# Patient Record
Sex: FEMALE | Race: WHITE | NOT HISPANIC OR LATINO
[De-identification: names, ages, dates, MRNs, and addresses within clinical notes are randomized per-mention and may not be internally consistent; named-entity substitution may affect disease eponyms.]

---

## 2017-04-11 ENCOUNTER — TRANSCRIPTION ENCOUNTER (OUTPATIENT)
Age: 57
End: 2017-04-11

## 2018-10-19 ENCOUNTER — APPOINTMENT (OUTPATIENT)
Dept: ORTHOPEDIC SURGERY | Facility: CLINIC | Age: 58
End: 2018-10-19
Payer: COMMERCIAL

## 2018-10-19 VITALS
SYSTOLIC BLOOD PRESSURE: 129 MMHG | WEIGHT: 200 LBS | BODY MASS INDEX: 31.39 KG/M2 | HEIGHT: 67 IN | DIASTOLIC BLOOD PRESSURE: 84 MMHG | HEART RATE: 88 BPM

## 2018-10-19 PROCEDURE — 72100 X-RAY EXAM L-S SPINE 2/3 VWS: CPT

## 2018-10-19 PROCEDURE — 73502 X-RAY EXAM HIP UNI 2-3 VIEWS: CPT | Mod: LT

## 2018-10-19 PROCEDURE — 99204 OFFICE O/P NEW MOD 45 MIN: CPT

## 2018-10-22 RX ORDER — GENTAMICIN SULFATE 1 MG/G
0.1 OINTMENT TOPICAL
Qty: 30 | Refills: 0 | Status: ACTIVE | COMMUNITY
Start: 2018-05-29

## 2018-10-22 RX ORDER — MELOXICAM 15 MG/1
15 TABLET ORAL
Qty: 30 | Refills: 0 | Status: ACTIVE | COMMUNITY
Start: 2018-06-19

## 2018-10-22 RX ORDER — CEPHALEXIN 500 MG/1
500 CAPSULE ORAL
Qty: 40 | Refills: 0 | Status: ACTIVE | COMMUNITY
Start: 2018-05-29

## 2018-11-30 ENCOUNTER — APPOINTMENT (OUTPATIENT)
Dept: ORTHOPEDIC SURGERY | Facility: CLINIC | Age: 58
End: 2018-11-30
Payer: COMMERCIAL

## 2018-11-30 VITALS
DIASTOLIC BLOOD PRESSURE: 84 MMHG | SYSTOLIC BLOOD PRESSURE: 124 MMHG | BODY MASS INDEX: 31.39 KG/M2 | HEIGHT: 67 IN | HEART RATE: 70 BPM | WEIGHT: 200 LBS

## 2018-11-30 DIAGNOSIS — Z78.9 OTHER SPECIFIED HEALTH STATUS: ICD-10-CM

## 2018-11-30 DIAGNOSIS — Z87.39 PERSONAL HISTORY OF OTHER DISEASES OF THE MUSCULOSKELETAL SYSTEM AND CONNECTIVE TISSUE: ICD-10-CM

## 2018-11-30 PROCEDURE — 99214 OFFICE O/P EST MOD 30 MIN: CPT

## 2018-11-30 PROCEDURE — 73502 X-RAY EXAM HIP UNI 2-3 VIEWS: CPT | Mod: LT

## 2018-12-15 ENCOUNTER — RX RENEWAL (OUTPATIENT)
Age: 58
End: 2018-12-15

## 2018-12-16 RX ORDER — DICLOFENAC SODIUM 75 MG/1
75 TABLET, DELAYED RELEASE ORAL
Qty: 60 | Refills: 1 | Status: ACTIVE | COMMUNITY
Start: 2018-10-19 | End: 1900-01-01

## 2018-12-17 ENCOUNTER — FORM ENCOUNTER (OUTPATIENT)
Age: 58
End: 2018-12-17

## 2018-12-18 ENCOUNTER — APPOINTMENT (OUTPATIENT)
Dept: MRI IMAGING | Facility: CLINIC | Age: 58
End: 2018-12-18

## 2018-12-18 ENCOUNTER — OUTPATIENT (OUTPATIENT)
Dept: OUTPATIENT SERVICES | Facility: HOSPITAL | Age: 58
LOS: 1 days | End: 2018-12-18
Payer: COMMERCIAL

## 2018-12-18 DIAGNOSIS — Z00.8 ENCOUNTER FOR OTHER GENERAL EXAMINATION: ICD-10-CM

## 2018-12-18 PROCEDURE — 73721 MRI JNT OF LWR EXTRE W/O DYE: CPT | Mod: 26,LT

## 2018-12-18 PROCEDURE — 73721 MRI JNT OF LWR EXTRE W/O DYE: CPT

## 2019-01-11 ENCOUNTER — APPOINTMENT (OUTPATIENT)
Dept: ORTHOPEDIC SURGERY | Facility: CLINIC | Age: 59
End: 2019-01-11
Payer: COMMERCIAL

## 2019-01-11 DIAGNOSIS — M23.92 UNSPECIFIED INTERNAL DERANGEMENT OF LEFT KNEE: ICD-10-CM

## 2019-01-11 DIAGNOSIS — M84.359A STRESS FRACTURE, HIP, UNSPECIFIED, INITIAL ENCOUNTER FOR FRACTURE: ICD-10-CM

## 2019-01-11 PROCEDURE — 99214 OFFICE O/P EST MOD 30 MIN: CPT

## 2019-01-11 RX ORDER — PSYLLIUM HUSK 0.4 G
1000-800 CAPSULE ORAL
Qty: 30 | Refills: 0 | Status: ACTIVE | COMMUNITY
Start: 2019-01-11 | End: 1900-01-01

## 2019-01-11 RX ORDER — DICLOFENAC SODIUM 75 MG/1
75 TABLET, DELAYED RELEASE ORAL
Qty: 1 | Refills: 1 | Status: ACTIVE | COMMUNITY
Start: 2019-01-11 | End: 1900-01-01

## 2019-01-11 NOTE — REVIEW OF SYSTEMS
[Arthralgia] : arthralgia [Joint Pain] : joint pain [Joint Stiffness] : joint stiffness [Joint Swelling] : joint swelling [Negative] : Heme/Lymph

## 2019-01-11 NOTE — PHYSICAL EXAM
[Coxalgic] : coxalgic [LE] : Sensory: Intact in bilateral lower extremities [Knee] : patellar 2+ and symmetric bilaterally [Ankle] : ankle 2+ and symmetric bilaterally [DP] : dorsalis pedis 2+ and symmetric bilaterally [PT] : posterior tibial 2+ and symmetric bilaterally [de-identified] : On general examination the patient is adequately groomed and nourished. The vital parameters are as recorded. \par There is no lymphedema or diffuse swelling, no varicose veins, no skin warmth/erythema/scars/swelling, no ulcers and no palpable lymph nodes or masses in both lower extremities. Bilateral pedal pulses are well palpable.\par Upper Extremity:\par Both right and left upper extremities are unremarkable in terms of skin rash, lesions, pigmentation, redness, tenderness, swelling, joint instability, abnormal deformity or crepitus. The overall range of motion, sensation, motor tone and strength testing are normal.\par \par Hip Exam:\par The gait is left stiff hip coxalgic.\par The patient has equal leg lengths \par Gabriel/Jhoan test is 6 inches on the right and 12 inches on the left. \par Active SLR is 60 degrees on the right and 40 degrees on the left. Both hips demonstrate no scars and the skin has no signs of inflammation or tenderness. \par Both Hips have a range of motion that is symmetrical in flexion and extension of:\par Hip flexion:            Right 100 degrees               Left 80 degrees\par Hip abduction:      Right 40 degrees                  Left 30 degrees\par Hip adduction:      Right 20 degrees                  Left  20 degrees\par Internal rotation:      Right 20 degrees                 Left 10 degrees\par External rotation:     Right 40 degrees                 Left 30 degrees\par ROM of the left hip is painful, but improved when compared to previous. \par There is no flexion contracture, deformity or instability. \par Labral impingement tests are negative.\par Right hip flexor, abductor and extensor power is grade 5.\par Left hip flexor, abductor and extensor power is grade 4+.\par \par Spine: There is no curvature of the spine or loss of normal lumbar lordosis. The skin is devoid of erythema, scars, pigmentation or rashes. There is no lumbar spasm and no tenderness especially at L4-L5 or L5-S1. \par The range of motion of the lumbar spine is normal and painless in flexion, extension, lateral flexion and rotation. There is no lumbar spine imbalance or instability detected.\par Bilateral passive SLR is 60 degrees in supine and sitting positions. Lasegue's Test is negative.\par Neurology:\par The patient is alert and oriented in person, place and time. The mood is calm and affect is normal.\par Testing for coordination including Rhomberg's Test and Finger-Nose Test, sensation, motor tone and power and deep tendon reflexes in both lower extremities is normal.\par \par Knee Exam\par The gait is left stiff knee antalgic.\par Knee alignment:   normal\par Both knees demonstrate no scars and the skin has no warmth, erythema, swelling or tenderness. \par Both knees have a range of motion of\par Extension:                    Right 0 degrees             Left -5 degrees\par Flexion:                                   Right 125 degrees          Left 125 degrees\par Left Knee: There is medial joint line tenderness. There is mild effusion. \par Darlyn's test is positive. Rohith test is positive.\par Lachman's test, Anterior/Posterior Drawer test and Pivot Shift Tests are negative. \par There is no mediolateral laxity and no anteroposterior instability. \par Patella compression test is negative and patellofemoral tracking is normal with no lateral subluxation, apprehension or instability. \par Right knee quadriceps and hamstrings power is 5. Right Knee Exam is normal.\par Left knee quadriceps and hamstrings power is 4+.\par  [de-identified] : The following radiographs were taken previous visit and read by me during this patients visit. I reviewed each radiograph in detail with the patient and discussed the findings as highlighted below. \par AP and false profile views of the left hip and AP view of the pelvis confirm moderate degenerative joint disease with joint space narrowing inferiorly with osteophyte formation and cyst formation. There is no acute femoral neck fracture. \par AP and lateral views of the lumbar spine taken lastt visit reveals intervertebral osteophytes s/p thanh placement and fusion L4-5, DJD\par \par MRI of the left hip 8/16/2018: Acute femoral neck stress fracture with marrow and periosseous edema. No discrete cortical break or displaced fragment.\par \par Repeat MRI :\par EXAM: MR HIP LT \par \par PROCEDURE DATE: 12/18/2018 \par \par INTERPRETATION: \par LEFT HIP MRI \par \par CLINICAL INDICATION: Left hip pain with clinical concern for stress fracture. \par \par TECHNIQUE: Multiplanar, multisequence MRI was obtained of the left hip. \par \par FINDINGS: \par \par There is a nondisplaced fracture along the medial aspect (compressive side) \par of the femoral neck with 1.7 cm in length fracture line and moderate \par surrounding bone marrow edema pattern. This has the appearance of a stress \par fracture. \par \par There is a small chronic appearing degenerative split tear in the \par anterosuperior labrum chronic degenerative blunting and fraying of the \par superior labrum. There is mild diffuse partial-thickness chondral loss in \par the left hip joint. There are no advanced arthritic changes. There is no \par osteonecrosis. There is a small left hip effusion. \par \par The abductors, short external rotators, adductors, and iliopsoas muscles and \par tendons are normal. There is mild proximal hamstring tendinopathy, without \par tear. \par \par The visualized neurovascular structures and intrapelvic contents are intact. \par \par IMPRESSION: \par \par Nondisplaced fracture along the medial aspect of the femoral neck \par (compressive side). This is the appearance of a stress fracture. \par \par MRI of the left knee reviewed as well, revealing a medial meniscus tear, with partial tearing/fraying along the free edge margin of the medial meniscus posterior horn. There is no acute displaced tear. \par

## 2019-01-11 NOTE — HISTORY OF PRESENT ILLNESS
[Stable] : stable [7] : an average pain level of 7/10 [de-identified] : Ms. KARLI CHAMORRO is a 57 year old female presents for follow up of left hip pain. She has had this pain beginning in August, following a fall she had while on vacation this summer. She states she landed directly onto the left knee off of a swimming tube. She soon after began to have left groin pain and difficulty walking. She was diagnosed with a left femoral neck stress fracture and treated conservatively with Pt, glucosamine, diclofenac, and rest. She states since onset the pain has improved. She is walking with the cane, but notes she has limited pain at this time, but continues to have weakness to the leg. She states weight bearing is not as painful at this time. She has been taking diclofenac, as well as resting as needed. She has had a repeat MRI on Dec 18, and is here for review. \par She also notes she continues to have more left knee pain than hip pain at this time, localized to the medial aspect of the left knee. She states it is activity related. \par She admits to a history of lumbar spine surgery during childhood, and admits to an altered gait pattern following this which has persisted throughout her life.  [3] : a current pain level of 3/10 [Walking] : walking [Intermit.] : ~He/She~ states the symptoms seem to be intermittent

## 2019-01-11 NOTE — DISCUSSION/SUMMARY
[de-identified] : Left hip DJD with femoral neck stress fracture, improving. \par The natural history and treatment of degenerative arthritis with stress reactions was discussed with the patient at length today. The spectrum of treatment including nonoperative modalities to surgical intervention was elucidated. Noninvasive and nonoperative treatment modalities include weight reduction, activity modification with low impact exercise,  as needed use of acetaminophen or anti-inflammatory medications if tolerated, glucosamine/chondroitin supplements, and physical therapy. Further treatments can include corticosteroid injection and the use of viscosupplementation with hyaluronic acid injections. Definitive surgical treatment can certainly include total joint arthroplasty also. The risks and benefits of each treatment options was discussed and all questions were answered.\par At this time her symptoms and exam findings are improving. The MRI is suggestive of healing. \par I have advised her to begin gentle exercise and physical therapy. I have recommended continued use of the cane. She has been recommended to avoid impact activities in therapy. A prescription for a course of physical therapy was provided. A refill prescription for non-steroidal anti-inflammatory medication - diclofenac was provided and the risks, benefits and side effects were discussed. \par She will follow up in 6-8 weeks.

## 2019-02-25 ENCOUNTER — RX RENEWAL (OUTPATIENT)
Age: 59
End: 2019-02-25

## 2019-02-25 RX ORDER — PANTOPRAZOLE 40 MG/1
40 TABLET, DELAYED RELEASE ORAL
Qty: 30 | Refills: 1 | Status: ACTIVE | COMMUNITY
Start: 2018-10-19 | End: 1900-01-01

## 2019-03-08 ENCOUNTER — APPOINTMENT (OUTPATIENT)
Dept: ORTHOPEDIC SURGERY | Facility: CLINIC | Age: 59
End: 2019-03-08
Payer: COMMERCIAL

## 2019-03-08 VITALS
HEIGHT: 67 IN | DIASTOLIC BLOOD PRESSURE: 86 MMHG | BODY MASS INDEX: 32.49 KG/M2 | SYSTOLIC BLOOD PRESSURE: 129 MMHG | WEIGHT: 207 LBS | HEART RATE: 69 BPM

## 2019-03-08 DIAGNOSIS — M16.12 UNILATERAL PRIMARY OSTEOARTHRITIS, LEFT HIP: ICD-10-CM

## 2019-03-08 PROCEDURE — 99213 OFFICE O/P EST LOW 20 MIN: CPT

## 2019-03-08 PROCEDURE — 73502 X-RAY EXAM HIP UNI 2-3 VIEWS: CPT | Mod: LT

## 2019-03-08 RX ORDER — PANTOPRAZOLE 40 MG/1
40 TABLET, DELAYED RELEASE ORAL
Qty: 30 | Refills: 0 | Status: ACTIVE | COMMUNITY
Start: 2019-03-08 | End: 1900-01-01

## 2019-03-08 RX ORDER — DICLOFENAC SODIUM 75 MG/1
75 TABLET, DELAYED RELEASE ORAL
Qty: 1 | Refills: 1 | Status: ACTIVE | COMMUNITY
Start: 2019-03-08 | End: 1900-01-01

## 2019-03-08 NOTE — DISCUSSION/SUMMARY
[de-identified] : Left hip DJD with femoral neck stress fracture, improving. \par The natural history and treatment of degenerative arthritis with stress reactions was discussed with the patient at length today. The spectrum of treatment including nonoperative modalities to surgical intervention was elucidated. Noninvasive and nonoperative treatment modalities include weight reduction, activity modification with low impact exercise,  as needed use of acetaminophen or anti-inflammatory medications if tolerated, glucosamine/chondroitin supplements, and physical therapy. Further treatments can include corticosteroid injection and the use of viscosupplementation with hyaluronic acid injections. Definitive surgical treatment can certainly include total joint arthroplasty also. The risks and benefits of each treatment options was discussed and all questions were answered.\par At this time her symptoms and exam findings are improved significantly.\par I have advised her to continue with physical therapy. She has been recommended to use the cane when needed in periods of increased pain.  She has been recommended to avoid excessive impact activities. She will continue physical therapy and home exercises. A refill prescription for non-steroidal anti-inflammatory medication - diclofenac was provided and the risks, benefits and side effects were discussed. \par She will follow up as needed.

## 2019-03-08 NOTE — PHYSICAL EXAM
[Coxalgic] : coxalgic [LE] : Sensory: Intact in bilateral lower extremities [Knee] : patellar 2+ and symmetric bilaterally [Ankle] : ankle 2+ and symmetric bilaterally [DP] : dorsalis pedis 2+ and symmetric bilaterally [PT] : posterior tibial 2+ and symmetric bilaterally [de-identified] : On general examination the patient is adequately groomed and nourished. The vital parameters are as recorded. \par There is no lymphedema or diffuse swelling, no varicose veins, no skin warmth/erythema/scars/swelling, no ulcers and no palpable lymph nodes or masses in both lower extremities. Bilateral pedal pulses are well palpable.\par Upper Extremity:\par Both right and left upper extremities are unremarkable in terms of skin rash, lesions, pigmentation, redness, tenderness, swelling, joint instability, abnormal deformity or crepitus. The overall range of motion, sensation, motor tone and strength testing are normal.\par \par Hip Exam:\par The gait is  coxalgic.\par The patient has equal leg lengths \par Gabriel/Jhoan test is 6 inches on the right and 8 inches on the left. \par Active SLR is 60 degrees on the right and 50 degrees on the left. Both hips demonstrate no scars and the skin has no signs of inflammation or tenderness. \par Both Hips have a range of motion that is symmetrical in flexion and extension of:\par Hip flexion:            Right 100 degrees               Left 90 degrees\par Hip abduction:      Right 40 degrees                  Left 40 degrees\par Hip adduction:      Right 20 degrees                  Left  20 degrees\par Internal rotation:      Right 20 degrees                 Left 10 degrees\par External rotation:     Right 40 degrees                 Left 30 degrees\par ROM of the left hip is mildly painful. \par There is no flexion contracture, deformity or instability. \par Labral impingement tests are negative.\par Right hip flexor, abductor and extensor power is grade 5.\par Left hip flexor, abductor and extensor power is grade 4+.\par \par Spine: There is no curvature of the spine or loss of normal lumbar lordosis. The skin is devoid of erythema, scars, pigmentation or rashes. There is no lumbar spasm and no tenderness especially at L4-L5 or L5-S1. \par The range of motion of the lumbar spine is normal and painless in flexion, extension, lateral flexion and rotation. There is no lumbar spine imbalance or instability detected.\par Bilateral passive SLR is 60 degrees in supine and sitting positions. Lasegue's Test is negative.\par Neurology:\par The patient is alert and oriented in person, place and time. The mood is calm and affect is normal.\par Testing for coordination including Rhomberg's Test and Finger-Nose Test, sensation, motor tone and power and deep tendon reflexes in both lower extremities is normal.\par \par Knee Exam\par The gait is left stiff knee antalgic.\par Knee alignment:   normal\par Both knees demonstrate no scars and the skin has no warmth, erythema, swelling or tenderness. \par Both knees have a range of motion of\par Extension:                    Right 0 degrees             Left -5 degrees\par Flexion:                                   Right 125 degrees          Left 125 degrees\par Left Knee: There is medial joint line tenderness. There is mild effusion. \par Darlyn's test is positive. Rohith test is positive.\par Lachman's test, Anterior/Posterior Drawer test and Pivot Shift Tests are negative. \par There is no mediolateral laxity and no anteroposterior instability. \par Patella compression test is negative and patellofemoral tracking is normal with no lateral subluxation, apprehension or instability. \par Right knee quadriceps and hamstrings power is 5. Right Knee Exam is normal.\par Left knee quadriceps and hamstrings power is 4+.\par  [de-identified] : The following radiographs were taken today and read by me during this patients visit. I reviewed each radiograph in detail with the patient and discussed the findings as highlighted below. \par AP and false profile views of the left hip and AP view of the pelvis confirm moderate degenerative joint disease with joint space narrowing inferiorly with osteophyte formation and cyst formation. There is no acute femoral neck fracture, no change. \par \par \par MRI of the left hip 8/16/2018: Acute femoral neck stress fracture with marrow and periosseous edema. No discrete cortical break or displaced fragment.\par \par Repeat MRI :\par EXAM: MR HIP LT \par \par PROCEDURE DATE: 12/18/2018 \par \par INTERPRETATION: \par LEFT HIP MRI \par \par CLINICAL INDICATION: Left hip pain with clinical concern for stress fracture. \par \par TECHNIQUE: Multiplanar, multisequence MRI was obtained of the left hip. \par \par FINDINGS: \par \par There is a nondisplaced fracture along the medial aspect (compressive side) \par of the femoral neck with 1.7 cm in length fracture line and moderate \par surrounding bone marrow edema pattern. This has the appearance of a stress \par fracture. \par \par There is a small chronic appearing degenerative split tear in the \par anterosuperior labrum chronic degenerative blunting and fraying of the \par superior labrum. There is mild diffuse partial-thickness chondral loss in \par the left hip joint. There are no advanced arthritic changes. There is no \par osteonecrosis. There is a small left hip effusion. \par \par The abductors, short external rotators, adductors, and iliopsoas muscles and \par tendons are normal. There is mild proximal hamstring tendinopathy, without \par tear. \par \par The visualized neurovascular structures and intrapelvic contents are intact. \par \par IMPRESSION: \par \par Nondisplaced fracture along the medial aspect of the femoral neck \par (compressive side). This is the appearance of a stress fracture. \par \par \par

## 2019-03-08 NOTE — HISTORY OF PRESENT ILLNESS
[Stable] : stable [3] : a current pain level of 3/10 [7] : an average pain level of 7/10 [Walking] : walking [Intermit.] : ~He/She~ states the symptoms seem to be intermittent [de-identified] : Ms. KARLI CHAMORRO is a 57 year old female presents for follow up of left hip pain. She has had this pain beginning in August, following a fall she had while on vacation this summer. She states she landed directly onto the left knee off of a swimming tube. She soon after began to have left groin pain and difficulty walking. She was diagnosed with a left femoral neck stress fracture and treated conservatively with Pt, glucosamine, diclofenac, and rest. She states since onset the pain has improved. She is now no longer requiring a cane for ambulation, and is able to walk faster and longer distances. She states weight bearing is not as painful at this time. She has been taking diclofenac, as well as resting as needed. She has been doing outpatient physical therapy, and is making significant progress with improving range of motion and strength. \par She admits to a history of lumbar spine surgery during childhood, and admits to an altered gait pattern following this which has persisted throughout her life.

## 2019-03-08 NOTE — CONSULT LETTER
[Dear  ___] : Dear  [unfilled], [Consult Letter:] : I had the pleasure of evaluating your patient, [unfilled]. [Please see my note below.] : Please see my note below. [Consult Closing:] : Thank you very much for allowing me to participate in the care of this patient.  If you have any questions, please do not hesitate to contact me. [Sincerely,] : Sincerely, [FreeTextEntry2] : TANESHA HENDERSON\par  [FreeTextEntry3] : Dimitri Serrano MD\par \par ______________________________________________\par [facility]: Hip/Knee Arthroplasty\par [address], [address], [place] [address]\par (t) [phone]\par (f) [phone]\par

## 2019-04-16 ENCOUNTER — APPOINTMENT (OUTPATIENT)
Dept: CARDIOLOGY | Facility: CLINIC | Age: 59
End: 2019-04-16
Payer: COMMERCIAL

## 2019-04-16 VITALS — DIASTOLIC BLOOD PRESSURE: 78 MMHG | SYSTOLIC BLOOD PRESSURE: 130 MMHG | RESPIRATION RATE: 16 BRPM | HEART RATE: 66 BPM

## 2019-04-16 DIAGNOSIS — R94.31 ABNORMAL ELECTROCARDIOGRAM [ECG] [EKG]: ICD-10-CM

## 2019-04-16 DIAGNOSIS — N32.81 OVERACTIVE BLADDER: ICD-10-CM

## 2019-04-16 DIAGNOSIS — E78.5 HYPERLIPIDEMIA, UNSPECIFIED: ICD-10-CM

## 2019-04-16 DIAGNOSIS — Z86.79 PERSONAL HISTORY OF OTHER DISEASES OF THE CIRCULATORY SYSTEM: ICD-10-CM

## 2019-04-16 DIAGNOSIS — R06.02 SHORTNESS OF BREATH: ICD-10-CM

## 2019-04-16 DIAGNOSIS — R06.00 DYSPNEA, UNSPECIFIED: ICD-10-CM

## 2019-04-16 DIAGNOSIS — Z87.19 PERSONAL HISTORY OF OTHER DISEASES OF THE DIGESTIVE SYSTEM: ICD-10-CM

## 2019-04-16 PROCEDURE — 93000 ELECTROCARDIOGRAM COMPLETE: CPT

## 2019-04-16 PROCEDURE — 99244 OFF/OP CNSLTJ NEW/EST MOD 40: CPT

## 2019-04-16 NOTE — PHYSICAL EXAM
[General Appearance - Well Developed] : well developed [Normal Appearance] : normal appearance [Well Groomed] : well groomed [General Appearance - Well Nourished] : well nourished [No Deformities] : no deformities [General Appearance - In No Acute Distress] : no acute distress [Normal Conjunctiva] : the conjunctiva exhibited no abnormalities [Eyelids - No Xanthelasma] : the eyelids demonstrated no xanthelasmas [Normal Oral Mucosa] : normal oral mucosa [No Oral Pallor] : no oral pallor [No Oral Cyanosis] : no oral cyanosis [Normal Jugular Venous A Waves Present] : normal jugular venous A waves present [Normal Jugular Venous V Waves Present] : normal jugular venous V waves present [No Jugular Venous Akers A Waves] : no jugular venous akers A waves [Normal Rate] : normal [Normal S1] : normal S1 [Normal S2] : normal S2 [No Murmur] : no murmurs heard [2+] : left 2+ [No Pitting Edema] : no pitting edema present [No Abnormalities] : the abdominal aorta was not enlarged and no bruit was heard [Exaggerated Use Of Accessory Muscles For Inspiration] : no accessory muscle use [Respiration, Rhythm And Depth] : normal respiratory rhythm and effort [Auscultation Breath Sounds / Voice Sounds] : lungs were clear to auscultation bilaterally [Abdomen Tenderness] : non-tender [Abdomen Soft] : soft [Abdomen Mass (___ Cm)] : no abdominal mass palpated [Abnormal Walk] : normal gait [Gait - Sufficient For Exercise Testing] : the gait was sufficient for exercise testing [Nail Clubbing] : no clubbing of the fingernails [Cyanosis, Localized] : no localized cyanosis [Petechial Hemorrhages (___cm)] : no petechial hemorrhages [Skin Color & Pigmentation] : normal skin color and pigmentation [] : no rash [No Venous Stasis] : no venous stasis [Skin Lesions] : no skin lesions [No Skin Ulcers] : no skin ulcer [No Xanthoma] : no  xanthoma was observed [Oriented To Time, Place, And Person] : oriented to person, place, and time [Mood] : the mood was normal [Affect] : the affect was normal [No Anxiety] : not feeling anxious [S3] : no S3 [S4] : no S4 [Right Carotid Bruit] : no bruit heard over the right carotid [Left Carotid Bruit] : no bruit heard over the left carotid [Right Femoral Bruit] : no bruit heard over the right femoral artery [Left Femoral Bruit] : no bruit heard over the left femoral artery

## 2019-04-16 NOTE — REASON FOR VISIT
[Initial Evaluation] : an initial evaluation of [Abnormal ECG] : an abnormal ECG [Hyperlipidemia] : hyperlipidemia [Dyspnea] : dyspnea

## 2019-04-16 NOTE — DISCUSSION/SUMMARY
[With Me] : with me [FreeTextEntry1] : Mrs. Urias is a 58 year old female here for initial evaluation. She reports some dyspnea on exertion over the last few months. Her EKG is a SR with LAD, though no evidence of ischemia. Her physical exam is unremarkable. Her BP is near goal\par Given her symptoms, she will have a 2d echo to evaluate for structural heart disease. I think she should also have a stress test though we will wait until her hip is feeling better and she can comfortably walk on a treadmill.\par Her discussed the fact that here ldl is elevated, and I have stressed the importance of weight loss, diet and exercise.\par I will call her with the results of the echocardiogram and arrange follow up.

## 2019-04-16 NOTE — HISTORY OF PRESENT ILLNESS
[FreeTextEntry1] : Mrs. Urias is a 58 year old female here for initial evaluation.\par She has a history of reflux on Protonix and an overactive bladder. She reports seeing a cardiologist in her 20's as part of a preoperative clearance. 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 She noted this to be worse while taking NSAIDs for a stress fracture in her hip. She is now feeling a little better.\par She denies chest pain, palpitations, lower extremity swelling, orthopnea, pnd, dizziness, lightheadedness and near syncope.\par Recent blood work was notable for an LDL of 145.\par \par

## 2019-05-06 ENCOUNTER — APPOINTMENT (OUTPATIENT)
Dept: CARDIOLOGY | Facility: CLINIC | Age: 59
End: 2019-05-06
Payer: COMMERCIAL

## 2019-05-06 PROCEDURE — 93306 TTE W/DOPPLER COMPLETE: CPT

## 2020-10-30 ENCOUNTER — APPOINTMENT (OUTPATIENT)
Dept: ORTHOPEDIC SURGERY | Facility: CLINIC | Age: 60
End: 2020-10-30
Payer: COMMERCIAL

## 2020-10-30 VITALS
WEIGHT: 205 LBS | DIASTOLIC BLOOD PRESSURE: 89 MMHG | HEIGHT: 65 IN | SYSTOLIC BLOOD PRESSURE: 131 MMHG | BODY MASS INDEX: 34.16 KG/M2 | HEART RATE: 73 BPM

## 2020-10-30 DIAGNOSIS — M54.5 LOW BACK PAIN: ICD-10-CM

## 2020-10-30 DIAGNOSIS — M70.60 TROCHANTERIC BURSITIS, UNSPECIFIED HIP: ICD-10-CM

## 2020-10-30 DIAGNOSIS — Z98.1 ARTHRODESIS STATUS: ICD-10-CM

## 2020-10-30 PROCEDURE — 72100 X-RAY EXAM L-S SPINE 2/3 VWS: CPT

## 2020-10-30 PROCEDURE — 99213 OFFICE O/P EST LOW 20 MIN: CPT

## 2020-10-30 PROCEDURE — 99072 ADDL SUPL MATRL&STAF TM PHE: CPT

## 2020-10-30 PROCEDURE — 73502 X-RAY EXAM HIP UNI 2-3 VIEWS: CPT | Mod: LT

## 2020-10-30 RX ORDER — DICLOFENAC SODIUM 75 MG/1
75 TABLET, DELAYED RELEASE ORAL
Qty: 1 | Refills: 0 | Status: ACTIVE | COMMUNITY
Start: 2020-10-30 | End: 1900-01-01

## 2020-10-30 NOTE — CONSULT LETTER
[Dear  ___] : Dear  [unfilled], [Consult Letter:] : I had the pleasure of evaluating your patient, [unfilled]. [Please see my note below.] : Please see my note below. [Consult Closing:] : Thank you very much for allowing me to participate in the care of this patient.  If you have any questions, please do not hesitate to contact me. [Sincerely,] : Sincerely, [FreeTextEntry2] : TANESHA HENDERSON\par  [FreeTextEntry3] : Dimitri Serrano MD\par \par ______________________________________________\par [facility]: Hip/Knee Arthroplasty\par [address], [address], [place] [address]\par (t) [phone]\par (f) [phone]\par

## 2020-10-30 NOTE — DISCUSSION/SUMMARY
[de-identified] : Left hip early DJD, with left hip GT bursitis, lumbar spine extensive surgical history. \par \par The natural history and treatment of degenerative arthritis with stress reactions was discussed with the patient at length today. The spectrum of treatment including nonoperative modalities to surgical intervention was elucidated. Noninvasive and nonoperative treatment modalities include weight reduction, activity modification with low impact exercise,  as needed use of acetaminophen or anti-inflammatory medications if tolerated, glucosamine/chondroitin supplements, and physical therapy. Further treatments can include corticosteroid injection and the use of viscosupplementation with hyaluronic acid injections. Definitive surgical treatment can certainly include total joint arthroplasty also. The risks and benefits of each treatment options was discussed and all questions were answered.\par At this time her symptoms are mainly stemming from the GT bursitis, and she has been recommended conservative treatment. She was offered a steroid injection to the bursal region, but would like to hold off. The patient was informed of the findings and recommended conservative management in the form of a home exercise program, activity modifications, stationary bicycling, swimming and weight loss program. A trial of Glucosamine and Chondroiten Sulphate was recommended. She has been recommended to avoid the inversion table as she reports this is what led to her increased pain\par A prescription for a course of physical therapy was provided for her left hip, including recommendation for ultrasound treatment for the bursitis. \par A prescription for non-steroidal anti-inflammatory medication - diclofenac was provided and the risks, benefits and side effects were discussed.\par Follow-up appointment was recommended in as needed in two months. \par

## 2020-10-30 NOTE — PHYSICAL EXAM
[Coxalgic] : coxalgic [LE] : Sensory: Intact in bilateral lower extremities [Knee] : patellar 2+ and symmetric bilaterally [Ankle] : ankle 2+ and symmetric bilaterally [DP] : dorsalis pedis 2+ and symmetric bilaterally [PT] : posterior tibial 2+ and symmetric bilaterally [de-identified] : On general examination the patient is adequately groomed and nourished. The vital parameters are as recorded. \par There is no lymphedema or diffuse swelling, no varicose veins, no skin warmth/erythema/scars/swelling, no ulcers and no palpable lymph nodes or masses in both lower extremities. Bilateral pedal pulses are well palpable.\par Upper Extremity:\par Both right and left upper extremities are unremarkable in terms of skin rash, lesions, pigmentation, redness, tenderness, swelling, joint instability, abnormal deformity or crepitus. The overall range of motion, sensation, motor tone and strength testing are normal.\par \par Hip Exam:\par The gait is  coxalgic.\par The patient has equal leg lengths. There is a mild left sided pelvic tilt due to the lumbar spine. \par Gabriel/Jhoan test is 6 inches on the right and 8 inches on the left. \par Active SLR is 60 degrees on the right and 50 degrees on the left. Both hips demonstrate no scars and the skin has no signs of inflammation or tenderness. There is tenderness to palpation of the left greater trochanteric bursal region. \par Both Hips have a range of motion that is symmetrical in flexion and extension of:\par Hip flexion:            Right 100 degrees               Left 90 degrees\par Hip abduction:      Right 40 degrees                  Left 40 degrees\par Hip adduction:      Right 20 degrees                  Left  20 degrees\par Internal rotation:      Right 20 degrees                 Left 10 degrees\par External rotation:     Right 40 degrees                 Left 30 degrees\par ROM of the left hip is mildly painful in the extremes of ER and abduction. \par There is no flexion contracture, deformity or instability. \par Labral impingement tests are negative.\par Right hip flexor, abductor and extensor power is grade 5.\par Left hip flexor, abductor and extensor power is grade 4+.\par \par Spine: There is flattening of the normal lordosis. There are well healed scars from previous surgeries. The skin is devoid of erythema, pigmentation or rashes. There is no lumbar spasm and no tenderness especially at L4-L5 or L5-S1. \par The range of motion of the lumbar spine is significantly restricted, with prior fusion and painless in flexion, extension, lateral flexion and rotation. There is no lumbar spine imbalance or instability detected.\par Bilateral passive SLR is 60 degrees in supine and sitting positions. Lasegue's Test is negative.\par Neurology:\par The patient is alert and oriented in person, place and time. The mood is calm and affect is normal.\par Testing for coordination including Rhomberg's Test and Finger-Nose Test, sensation, motor tone and power and deep tendon reflexes in both lower extremities is normal.\par \par Knee Exam: \par The gait and station is normal\par Both knees have a valgus alignment of 5 degrees with no flexion contracture or deformity. Both knees demonstrate no scars and the skin has no warmth, erythema, swelling or tenderness. \par Both knees have a normal range of motion of 0 degrees to 130 degrees flexion. There are no signs of joint line tenderness or effusion. \par Darlyn's test is negative. Rohith test is negative. \par Lachman's test, Anterior/Posterior Drawer test and Pivot Shift Tests are negative. There is no mediolateral laxity and no anteroposterior instability. \par Patella compression test is negative and patellofemoral tracking is normal with no lateral subluxation, apprehension or instability. \par Both knees quadriceps and hamstrings power is normal.\par \par  \par  [de-identified] : The following radiographs were taken today and read by me during this patients visit. I reviewed each radiograph in detail with the patient and discussed the findings as highlighted below. \par AP and false profile views of the left hip and AP view of the pelvis confirm mild to moderate degenerative joint disease with joint space narrowing inferiorly. There is no acute femoral neck fracture, no change. \par AP and lateral views of the lumbar spine reveal s/p multiple fusions, Pineda rods, with L4-5, with flat back,loss of lordosis of the spine\par \par \par

## 2020-10-30 NOTE — HISTORY OF PRESENT ILLNESS
[7] : an average pain level of 7/10 [Intermit.] : ~He/She~ states the symptoms seem to be intermittent [Worsening] : worsening [5] : a current pain level of 5/10 [Direct Pressure] : worsened by direct pressure [Sitting] : worsened by sitting [Walking] : worsened by walking [Rest] : relieved by rest [de-identified] : Ms. KARLI CHAMORRO is a 57 year old female presents for follow up of left hip pain. She was last seen in Mar 2019 for the same complaint. She was diagnosed with a left femoral neck stress fracture and treated conservatively with Pt, glucosamine, diclofenac, and rest. She was feeling great for some time, but developed left lateral sided hip pain over the last month. She attributes the pain to using an inversion table, and notes she developed pain following doing the activity over a couple days. She notes her pain is localized to the lateral aspect of the left hip, sore in nature, and present with direct pressure, walking long distances, with hip motion in the extremes. She notes occasionally the pain radiates, but rarely. She also notes she has had soreness along the anterior superior left knee. She notes it feels muscular in nature, present when walking. She has been taking over the counter medications as needed. She denies any recent treatment for her symptoms. \par She admits to a long history of lower back pain, with prior surgery as a child with Pnieda rods in 1977, and then a lumbar fusion more recently in 2013 at [facility].  She notes she has had an altered gait pattern since childhood due to the above, and notes she has had stiffness and soreness in the pelvis and hips longstanding. \par

## 2022-03-29 ENCOUNTER — OUTPATIENT (OUTPATIENT)
Dept: OUTPATIENT SERVICES | Facility: HOSPITAL | Age: 62
LOS: 1 days | End: 2022-03-29
Payer: COMMERCIAL

## 2022-03-29 DIAGNOSIS — Z20.828 CONTACT WITH AND (SUSPECTED) EXPOSURE TO OTHER VIRAL COMMUNICABLE DISEASES: ICD-10-CM

## 2022-03-29 LAB — SARS-COV-2 RNA SPEC QL NAA+PROBE: SIGNIFICANT CHANGE UP

## 2022-03-29 PROCEDURE — U0005: CPT

## 2022-03-29 PROCEDURE — U0003: CPT

## 2022-03-31 ENCOUNTER — OUTPATIENT (OUTPATIENT)
Dept: OUTPATIENT SERVICES | Facility: HOSPITAL | Age: 62
LOS: 1 days | End: 2022-03-31
Payer: COMMERCIAL

## 2022-03-31 DIAGNOSIS — M54.16 RADICULOPATHY, LUMBAR REGION: ICD-10-CM

## 2022-03-31 PROCEDURE — 62323 NJX INTERLAMINAR LMBR/SAC: CPT

## 2023-03-23 ENCOUNTER — TRANSCRIPTION ENCOUNTER (OUTPATIENT)
Age: 63
End: 2023-03-23

## 2023-03-31 ENCOUNTER — APPOINTMENT (OUTPATIENT)
Dept: PAIN MANAGEMENT | Facility: CLINIC | Age: 63
End: 2023-03-31
Payer: COMMERCIAL

## 2023-03-31 VITALS — WEIGHT: 205 LBS | HEIGHT: 65 IN | BODY MASS INDEX: 34.16 KG/M2

## 2023-03-31 PROCEDURE — 99213 OFFICE O/P EST LOW 20 MIN: CPT

## 2023-03-31 NOTE — HISTORY OF PRESENT ILLNESS
[Lower back] : lower back [7] : 7 [5] : 5 [Dull/Aching] : dull/aching [Constant] : constant [Household chores] : household chores [Sleep] : sleep [Sitting] : sitting [Lying in bed] : lying in bed [Full time] : Work status: full time [de-identified] : 03/31/2023- PATIENT STATES HAS DONE PHYSICAL THERAPY IN THE PAST UNKWN DATE AS PER PT 3X A WEEK  AND REPORT'S MILD IMPROVEMENT WITH PAIN.\par PT STATES CURRENTLT HOME STRETCHING PROGRAM AT HOME 3X A WEEK  AND REPORT'S MILD IMPROVEMENT WITH PAIN.  \par  [] : Post Surgical Visit: no [FreeTextEntry6] : NUBNESS [FreeTextEntry7] : RT IVANIA EONLY  [de-identified] : MRI LS 03/16/2023

## 2023-03-31 NOTE — REASON FOR VISIT
[FreeTextEntry2] : PT IS BEING SEEN FOR A FOLLOW-UP IN OFFICE  PAIN MANAGEMENT VISIT FOR EVAL FOR MUINRA

## 2023-03-31 NOTE — ASSESSMENT
[FreeTextEntry1] : Interim history\par he patient returns with pain that has been treated adequately in the past with epidural steroid injections.  The patient's complaints are consistent with radiculopathy. Patient's ADL's increase with prior MUNIRA.   The last injection gave greater than 60% reduction of the pain but now there is a return of symptoms. The patient is requesting a subsequent epidural steroid injection to alleviate pain and improve functional ability and quality of life.  Patient is a candidate.\par Objective findings\par Since the last visit, there have been no new imaging studies. THe patient has no new symptoms except the return of the their pain complaints. Motor and sensory function is unchanged.\par Plan\par Spoke to patient about epidural steroid injections. Explained risks, benefits and alternatives including but not limited to the risk of infection, bleeding, headache, syncopal episode, failure to resolve issues, allergic reaction, symptom recurrence, allergic reaction, nerve injury, and increased pain. The patient understands the risks. All questions were answered. The patient is willing to proceed.\par

## 2023-04-04 ENCOUNTER — TRANSCRIPTION ENCOUNTER (OUTPATIENT)
Age: 63
End: 2023-04-04

## 2023-04-04 RX ORDER — DICLOFENAC SODIUM 75 MG/1
1 TABLET, DELAYED RELEASE ORAL
Refills: 0 | DISCHARGE

## 2023-04-04 NOTE — ASU PATIENT PROFILE, ADULT - NSICDXPASTSURGICALHX_GEN_ALL_CORE_FT
PAST SURGICAL HISTORY:  H/O foot surgery B/L    History of lumbar fusion x2    History of tonsillectomy

## 2023-04-04 NOTE — ASU DISCHARGE PLAN (ADULT/PEDIATRIC) - NS MD DC FALL RISK RISK
For information on Fall & Injury Prevention, visit: [email or web address] OR  [email or web address] OR  https://www.cdc.gov/steadi/patient.html

## 2023-04-05 RX ORDER — PANTOPRAZOLE SODIUM 20 MG/1
1 TABLET, DELAYED RELEASE ORAL
Refills: 0 | DISCHARGE

## 2023-04-06 ENCOUNTER — APPOINTMENT (OUTPATIENT)
Dept: ORTHOPEDIC SURGERY | Facility: HOSPITAL | Age: 63
End: 2023-04-06
Payer: COMMERCIAL

## 2023-04-06 ENCOUNTER — OUTPATIENT (OUTPATIENT)
Dept: OUTPATIENT SERVICES | Facility: HOSPITAL | Age: 63
LOS: 1 days | End: 2023-04-06
Payer: COMMERCIAL

## 2023-04-06 VITALS
OXYGEN SATURATION: 99 % | TEMPERATURE: 98 F | HEIGHT: 67 IN | DIASTOLIC BLOOD PRESSURE: 90 MMHG | HEART RATE: 74 BPM | SYSTOLIC BLOOD PRESSURE: 118 MMHG | RESPIRATION RATE: 14 BRPM | WEIGHT: 212.08 LBS

## 2023-04-06 VITALS
RESPIRATION RATE: 17 BRPM | OXYGEN SATURATION: 97 % | DIASTOLIC BLOOD PRESSURE: 69 MMHG | TEMPERATURE: 98 F | SYSTOLIC BLOOD PRESSURE: 109 MMHG | HEART RATE: 71 BPM

## 2023-04-06 DIAGNOSIS — M54.16 RADICULOPATHY, LUMBAR REGION: ICD-10-CM

## 2023-04-06 DIAGNOSIS — Z98.1 ARTHRODESIS STATUS: Chronic | ICD-10-CM

## 2023-04-06 DIAGNOSIS — Z90.89 ACQUIRED ABSENCE OF OTHER ORGANS: Chronic | ICD-10-CM

## 2023-04-06 DIAGNOSIS — Z98.890 OTHER SPECIFIED POSTPROCEDURAL STATES: Chronic | ICD-10-CM

## 2023-04-06 PROCEDURE — 62323 NJX INTERLAMINAR LMBR/SAC: CPT

## 2023-04-06 RX ORDER — MELOXICAM 15 MG/1
1 TABLET ORAL
Refills: 0 | DISCHARGE

## 2023-04-06 RX ORDER — SEMAGLUTIDE 0.68 MG/ML
0.5 INJECTION, SOLUTION SUBCUTANEOUS
Refills: 0 | DISCHARGE

## 2023-04-06 RX ORDER — DICLOFENAC SODIUM 75 MG/1
1 TABLET, DELAYED RELEASE ORAL
Refills: 0 | DISCHARGE

## 2023-05-26 PROBLEM — M54.16 RADICULOPATHY, LUMBAR REGION: Chronic | Status: ACTIVE | Noted: 2023-04-04

## 2023-05-26 PROBLEM — K21.9 GASTRO-ESOPHAGEAL REFLUX DISEASE WITHOUT ESOPHAGITIS: Chronic | Status: ACTIVE | Noted: 2023-04-04

## 2023-06-16 ENCOUNTER — APPOINTMENT (OUTPATIENT)
Dept: PAIN MANAGEMENT | Facility: CLINIC | Age: 63
End: 2023-06-16
Payer: COMMERCIAL

## 2023-06-16 DIAGNOSIS — M54.16 RADICULOPATHY, LUMBAR REGION: ICD-10-CM

## 2023-06-16 PROCEDURE — 99213 OFFICE O/P EST LOW 20 MIN: CPT

## 2023-06-16 RX ORDER — AMITRIPTYLINE HYDROCHLORIDE 10 MG/1
10 TABLET, FILM COATED ORAL
Qty: 90 | Refills: 5 | Status: ACTIVE | COMMUNITY
Start: 2023-06-16 | End: 1900-01-01

## 2023-06-16 NOTE — HISTORY OF PRESENT ILLNESS
[Lower back] : lower back [7] : 7 [5] : 5 [Dull/Aching] : dull/aching [Constant] : constant [Household chores] : household chores [Sleep] : sleep [Sitting] : sitting [Lying in bed] : lying in bed [Full time] : Work status: full time [Steroid] : Steroid [] : Post Surgical Visit: no [FreeTextEntry6] : NUBNESS [FreeTextEntry7] : RT IVANIA EONLY  [de-identified] : MRI LS 03/16/2023 [de-identified] : 04/06/2023 [de-identified] : LS  [de-identified] : LEONARD  [TWNoteComboBox1] : 10% [de-identified] : 03/31/2023- PATIENT STATES HAS DONE PHYSICAL THERAPY IN THE PAST UNKWN DATE AS PER PT 3X A WEEK  AND REPORT'S MILD IMPROVEMENT WITH PAIN.\par PT STATES CURRENTLT HOME STRETCHING PROGRAM AT HOME 3X A WEEK  AND REPORT'S MILD IMPROVEMENT WITH PAIN.  \par

## 2023-06-28 ENCOUNTER — APPOINTMENT (OUTPATIENT)
Dept: PAIN MANAGEMENT | Facility: CLINIC | Age: 63
End: 2023-06-28

## 2024-03-15 ENCOUNTER — NON-APPOINTMENT (OUTPATIENT)
Age: 64
End: 2024-03-15

## 2025-05-13 ENCOUNTER — APPOINTMENT (OUTPATIENT)
Dept: ORTHOPEDIC SURGERY | Facility: CLINIC | Age: 65
End: 2025-05-13
Payer: COMMERCIAL

## 2025-05-13 DIAGNOSIS — M67.449 GANGLION, UNSPECIFIED HAND: ICD-10-CM

## 2025-05-13 DIAGNOSIS — E78.00 PURE HYPERCHOLESTEROLEMIA, UNSPECIFIED: ICD-10-CM

## 2025-05-13 PROCEDURE — 73140 X-RAY EXAM OF FINGER(S): CPT | Mod: LT

## 2025-05-13 PROCEDURE — 99214 OFFICE O/P EST MOD 30 MIN: CPT

## 2025-05-13 RX ORDER — SEMAGLUTIDE 2.4 MG/.75ML
2.4 INJECTION, SOLUTION SUBCUTANEOUS
Refills: 0 | Status: ACTIVE | COMMUNITY

## 2025-05-29 RX ORDER — HYDROCODONE BITARTRATE AND ACETAMINOPHEN 5; 325 MG/1; MG/1
5-325 TABLET ORAL
Qty: 10 | Refills: 0 | Status: ACTIVE | COMMUNITY
Start: 2025-05-29 | End: 1900-01-01

## 2025-05-30 ENCOUNTER — APPOINTMENT (OUTPATIENT)
Age: 65
End: 2025-05-30

## 2025-05-30 PROCEDURE — 26116 EXC HAND TUM DEEP < 1.5 CM: CPT | Mod: AS,F2

## 2025-05-30 PROCEDURE — 26116 EXC HAND TUM DEEP < 1.5 CM: CPT | Mod: F2

## 2025-06-11 ENCOUNTER — APPOINTMENT (OUTPATIENT)
Dept: ORTHOPEDIC SURGERY | Facility: CLINIC | Age: 65
End: 2025-06-11
Payer: COMMERCIAL

## 2025-06-11 PROCEDURE — 99024 POSTOP FOLLOW-UP VISIT: CPT

## 2025-08-13 ENCOUNTER — APPOINTMENT (OUTPATIENT)
Dept: ORTHOPEDIC SURGERY | Facility: CLINIC | Age: 65
End: 2025-08-13
Payer: COMMERCIAL

## 2025-08-13 DIAGNOSIS — M67.449 GANGLION, UNSPECIFIED HAND: ICD-10-CM

## 2025-08-13 PROCEDURE — 20612 ASPIRATE/INJ GANGLION CYST: CPT | Mod: LT

## 2025-08-13 PROCEDURE — 99213 OFFICE O/P EST LOW 20 MIN: CPT | Mod: 25

## 2025-08-20 ENCOUNTER — APPOINTMENT (OUTPATIENT)
Dept: ORTHOPEDIC SURGERY | Facility: CLINIC | Age: 65
End: 2025-08-20

## (undated) DEVICE — PACK IV START WITH CHG

## (undated) DEVICE — TUBING ALARIS PUMP MODULE NON-DEHP

## (undated) DEVICE — CATH IV SAFE 24GX3/4

## (undated) DEVICE — GLV 7.5 ULTRAFREE MAX

## (undated) DEVICE — GLV 8.5 PROTEXIS (WHITE)

## (undated) DEVICE — NDL SPINAL 22G X 3.5" QUINCKE

## (undated) DEVICE — TRAY EPIDURAL SINGLE DOSE

## (undated) DEVICE — SOL INJ LR 500ML

## (undated) DEVICE — STYLET  ENDOTRACH 7.5MM X 10MM

## (undated) DEVICE — CATH IV SAFE BC BLU 22GAX1.0"

## (undated) DEVICE — CATH IV SAFE BC 22G X 1" (BLUE)

## (undated) DEVICE — NDL SPNL WHIT 22GX3.5IN

## (undated) DEVICE — GLV 8 ULTRAFREE MAX

## (undated) DEVICE — SYR IV FLUSH SALINE 10ML 30/TY